# Patient Record
Sex: FEMALE | URBAN - METROPOLITAN AREA
[De-identification: names, ages, dates, MRNs, and addresses within clinical notes are randomized per-mention and may not be internally consistent; named-entity substitution may affect disease eponyms.]

---

## 2019-08-12 ENCOUNTER — APPOINTMENT (RX ONLY)
Dept: URBAN - METROPOLITAN AREA CLINIC 88 | Facility: CLINIC | Age: 69
Setting detail: DERMATOLOGY
End: 2019-08-12

## 2019-08-12 DIAGNOSIS — L40.0 PSORIASIS VULGARIS: ICD-10-CM

## 2019-08-12 DIAGNOSIS — L29.8 OTHER PRURITUS: ICD-10-CM

## 2019-08-12 DIAGNOSIS — L29.89 OTHER PRURITUS: ICD-10-CM

## 2019-08-12 PROCEDURE — ? ADDITIONAL NOTES

## 2019-08-12 PROCEDURE — 99202 OFFICE O/P NEW SF 15 MIN: CPT

## 2019-08-12 PROCEDURE — ? PRESCRIPTION

## 2019-08-12 PROCEDURE — ? TREATMENT REGIMEN

## 2019-08-12 RX ORDER — HYDROXYZINE HYDROCHLORIDE 25 MG/1
TABLET, FILM COATED ORAL
Qty: 30 | Refills: 2 | Status: ERX | COMMUNITY
Start: 2019-08-12

## 2019-08-12 RX ORDER — CLOBETASOL PROPIONATE 0.5 MG/ML
SHAMPOO TOPICAL
Qty: 1 | Refills: 2 | Status: ERX

## 2019-08-12 RX ORDER — CLOBETASOL PROPIONATE 0.5 MG/ML
SHAMPOO TOPICAL
Qty: 1 | Refills: 2 | Status: ERX | COMMUNITY
Start: 2019-08-12

## 2019-08-12 RX ORDER — CLOBETASOL PROPIONATE 0.46 MG/ML
SOLUTION TOPICAL
Qty: 1 | Refills: 4 | Status: ERX

## 2019-08-12 RX ORDER — CLOBETASOL PROPIONATE 0.46 MG/ML
SOLUTION TOPICAL
Qty: 1 | Refills: 4 | Status: ERX | COMMUNITY
Start: 2019-08-12

## 2019-08-12 RX ADMIN — HYDROXYZINE HYDROCHLORIDE: 25 TABLET, FILM COATED ORAL at 16:53

## 2019-08-12 RX ADMIN — CLOBETASOL PROPIONATE: 0.46 SOLUTION TOPICAL at 16:51

## 2019-08-12 RX ADMIN — CLOBETASOL PROPIONATE: 0.5 SHAMPOO TOPICAL at 16:51

## 2019-08-12 ASSESSMENT — LOCATION DETAILED DESCRIPTION DERM
LOCATION DETAILED: LEFT INFERIOR FRONTAL SCALP
LOCATION DETAILED: LEFT INFERIOR PARIETAL SCALP
LOCATION DETAILED: LEFT CENTRAL PARIETAL SCALP
LOCATION DETAILED: LEFT CENTRAL POSTAURICULAR SKIN

## 2019-08-12 ASSESSMENT — LOCATION ZONE DERM: LOCATION ZONE: SCALP

## 2019-08-12 ASSESSMENT — LOCATION SIMPLE DESCRIPTION DERM: LOCATION SIMPLE: SCALP

## 2019-08-12 NOTE — PROCEDURE: ADDITIONAL NOTES
Detail Level: Simple
Additional Notes: Hydroxyzine was also sent for patient to take at night as needed for extreme itching. *Rx was later resent to [facility] as shampoo and solution was totaling $200 at local

## 2019-08-12 NOTE — PROCEDURE: TREATMENT REGIMEN
Plan: Apply solution after hair wash regimen, BID x weeks as daily maintenance regimen
Action 3: Continue
Sig For Treatment 1 (If Needed): twice daily
Initiate Regimen: Clobetasol shampoo: QD x 2 weeks
Detail Level: Zone
Action 1: Start
Treatment 1: Clobetasol solution
Initiate Treatment: Hydroxyzine 25mg:QHS PRN itchy scalp due to psoriasis

## 2019-08-26 ENCOUNTER — APPOINTMENT (RX ONLY)
Dept: URBAN - METROPOLITAN AREA CLINIC 88 | Facility: CLINIC | Age: 69
Setting detail: DERMATOLOGY
End: 2019-08-26

## 2019-08-26 DIAGNOSIS — L29.8 OTHER PRURITUS: ICD-10-CM

## 2019-08-26 DIAGNOSIS — L40.0 PSORIASIS VULGARIS: ICD-10-CM

## 2019-08-26 DIAGNOSIS — L29.89 OTHER PRURITUS: ICD-10-CM

## 2019-08-26 PROCEDURE — 11900 INJECT SKIN LESIONS </W 7: CPT

## 2019-08-26 PROCEDURE — 99213 OFFICE O/P EST LOW 20 MIN: CPT | Mod: 25

## 2019-08-26 PROCEDURE — ? INTRALESIONAL KENALOG

## 2019-08-26 PROCEDURE — ? TREATMENT REGIMEN

## 2019-08-26 ASSESSMENT — LOCATION DETAILED DESCRIPTION DERM
LOCATION DETAILED: LEFT CENTRAL FRONTAL SCALP
LOCATION DETAILED: RIGHT INFERIOR OCCIPITAL SCALP
LOCATION DETAILED: LEFT SUPERIOR POSTAURICULAR SKIN
LOCATION DETAILED: LEFT INFERIOR PARIETAL SCALP
LOCATION DETAILED: RIGHT POSTAURICULAR SKIN
LOCATION DETAILED: RIGHT INFERIOR PARIETAL SCALP
LOCATION DETAILED: LEFT INFERIOR FRONTAL SCALP
LOCATION DETAILED: LEFT CENTRAL POSTAURICULAR SKIN
LOCATION DETAILED: LEFT CENTRAL PARIETAL SCALP
LOCATION DETAILED: LEFT INFERIOR OCCIPITAL SCALP

## 2019-08-26 ASSESSMENT — LOCATION SIMPLE DESCRIPTION DERM
LOCATION SIMPLE: SCALP
LOCATION SIMPLE: POSTERIOR SCALP

## 2019-08-26 ASSESSMENT — LOCATION ZONE DERM: LOCATION ZONE: SCALP

## 2019-08-26 NOTE — PROCEDURE: INTRALESIONAL KENALOG
Detail Level: Zone
Consent: The risks of atrophy were reviewed with the patient.
Total Volume (Ccs): 2.0
Kenalog Preparation: Kenalog
X Size Of Lesion In Cm (Optional): 0
Concentration Of Kenalog Solution Injected (Mg/Ml): 10.0
Administered By (Optional): Carol Solano
Medical Necessity Clause: This procedure was medically necessary because the lesions that were treated were:
Include Z78.9 (Other Specified Conditions Influencing Health Status) As An Associated Diagnosis?: No

## 2019-08-26 NOTE — PROCEDURE: TREATMENT REGIMEN
Action 4: Continue
Plan: Apply solution after hair wash regimen, BID x weeks as daily maintenance regimen
Modify Regimen: Hydroxyzine 1/2 a tablet of 25mg:QHS PRN itchy scalp due to psoriasis..
Continue Regimen: Clobetasol shampoo: QD x 2 weeks
Action 1: Start
Detail Level: Zone
Sig For Treatment 1 (If Needed): twice daily

## 2019-09-09 RX ORDER — HYDROXYZINE HYDROCHLORIDE 25 MG/1
TABLET, FILM COATED ORAL
Qty: 90 | Refills: 2 | Status: ERX

## 2019-09-10 ENCOUNTER — APPOINTMENT (RX ONLY)
Dept: URBAN - METROPOLITAN AREA CLINIC 88 | Facility: CLINIC | Age: 69
Setting detail: DERMATOLOGY
End: 2019-09-10

## 2019-09-10 DIAGNOSIS — L40.0 PSORIASIS VULGARIS: ICD-10-CM

## 2019-09-10 DIAGNOSIS — L29.89 OTHER PRURITUS: ICD-10-CM

## 2019-09-10 DIAGNOSIS — L29.8 OTHER PRURITUS: ICD-10-CM

## 2019-09-10 PROBLEM — E03.9 HYPOTHYROIDISM, UNSPECIFIED: Status: ACTIVE | Noted: 2019-09-10

## 2019-09-10 PROCEDURE — ? INTRALESIONAL KENALOG

## 2019-09-10 PROCEDURE — 99213 OFFICE O/P EST LOW 20 MIN: CPT | Mod: 25

## 2019-09-10 PROCEDURE — ? TREATMENT REGIMEN

## 2019-09-10 PROCEDURE — 11900 INJECT SKIN LESIONS </W 7: CPT

## 2019-09-10 ASSESSMENT — LOCATION SIMPLE DESCRIPTION DERM
LOCATION SIMPLE: POSTERIOR SCALP
LOCATION SIMPLE: SCALP

## 2019-09-10 ASSESSMENT — LOCATION DETAILED DESCRIPTION DERM
LOCATION DETAILED: RIGHT POSTAURICULAR SKIN
LOCATION DETAILED: LEFT INFERIOR OCCIPITAL SCALP
LOCATION DETAILED: RIGHT INFERIOR PARIETAL SCALP
LOCATION DETAILED: LEFT CENTRAL POSTAURICULAR SKIN
LOCATION DETAILED: LEFT CENTRAL PARIETAL SCALP
LOCATION DETAILED: LEFT INFERIOR FRONTAL SCALP
LOCATION DETAILED: LEFT SUPERIOR POSTAURICULAR SKIN
LOCATION DETAILED: LEFT CENTRAL FRONTAL SCALP
LOCATION DETAILED: RIGHT INFERIOR OCCIPITAL SCALP
LOCATION DETAILED: LEFT INFERIOR PARIETAL SCALP

## 2019-09-10 ASSESSMENT — LOCATION ZONE DERM: LOCATION ZONE: SCALP

## 2019-09-10 NOTE — PROCEDURE: INTRALESIONAL KENALOG
Consent: The risks of atrophy were reviewed with the patient.
Total Volume (Ccs): 2.0
Concentration Of Kenalog Solution Injected (Mg/Ml): 10.0
Administered By (Optional): IMTIAZ
X Size Of Lesion In Cm (Optional): 0
Medical Necessity Clause: This procedure was medically necessary because the lesions that were treated were:
Detail Level: Zone
Include Z78.9 (Other Specified Conditions Influencing Health Status) As An Associated Diagnosis?: No
Kenalog Preparation: Kenalog

## 2019-09-10 NOTE — PROCEDURE: TREATMENT REGIMEN
Action 3: Continue
Continue Regimen: Clobetasol shampoo: QD x 2 weeks
Plan: Apply solution after hair wash regimen, BID x weeks as daily maintenance regimen
Modify Regimen: Hydroxyzine 1/2 a tablet of 25mg:QHS PRN itchy scalp due to psoriasis..
Sig For Treatment 1 (If Needed): twice daily
Action 1: Start
Detail Level: Zone

## 2019-12-23 ENCOUNTER — APPOINTMENT (RX ONLY)
Dept: URBAN - METROPOLITAN AREA CLINIC 88 | Facility: CLINIC | Age: 69
Setting detail: DERMATOLOGY
End: 2019-12-23

## 2019-12-23 DIAGNOSIS — L29.89 OTHER PRURITUS: ICD-10-CM

## 2019-12-23 DIAGNOSIS — L40.0 PSORIASIS VULGARIS: ICD-10-CM

## 2019-12-23 DIAGNOSIS — L29.8 OTHER PRURITUS: ICD-10-CM

## 2019-12-23 PROCEDURE — ? TREATMENT REGIMEN

## 2019-12-23 PROCEDURE — ? INTRALESIONAL KENALOG

## 2019-12-23 PROCEDURE — ? COUNSELING

## 2019-12-23 PROCEDURE — 11900 INJECT SKIN LESIONS </W 7: CPT

## 2019-12-23 PROCEDURE — ? PRESCRIPTION

## 2019-12-23 PROCEDURE — 99213 OFFICE O/P EST LOW 20 MIN: CPT | Mod: 25

## 2019-12-23 RX ORDER — CLOBETASOL PROPIONATE 0.5 MG/ML
SOLUTION TOPICAL
Qty: 1 | Refills: 5 | Status: ERX

## 2019-12-23 ASSESSMENT — LOCATION DETAILED DESCRIPTION DERM
LOCATION DETAILED: RIGHT INFERIOR OCCIPITAL SCALP
LOCATION DETAILED: MID-OCCIPITAL SCALP
LOCATION DETAILED: RIGHT OCCIPITAL SCALP

## 2019-12-23 ASSESSMENT — LOCATION SIMPLE DESCRIPTION DERM
LOCATION SIMPLE: POSTERIOR SCALP
LOCATION SIMPLE: SCALP

## 2019-12-23 ASSESSMENT — LOCATION ZONE DERM: LOCATION ZONE: SCALP

## 2019-12-23 NOTE — PROCEDURE: TREATMENT REGIMEN
Action 1: Continue
Treatment 1: Calcipotriene solution
Otc Regimen: Keralyte shampoo wash once daily with shampoo
Detail Level: Zone

## 2019-12-23 NOTE — PROCEDURE: INTRALESIONAL KENALOG
Total Volume (Ccs): 2.0
Medical Necessity Clause: This procedure was medically necessary because the lesions that were treated were:
Concentration Of Kenalog Solution Injected (Mg/Ml): 10.0
Include Z78.9 (Other Specified Conditions Influencing Health Status) As An Associated Diagnosis?: No
Administered By (Optional): IMTIAZ
Consent: The risks of atrophy were reviewed with the patient.
Detail Level: Zone
Kenalog Preparation: Kenalog
X Size Of Lesion In Cm (Optional): 0

## 2020-02-18 ENCOUNTER — APPOINTMENT (RX ONLY)
Dept: URBAN - METROPOLITAN AREA CLINIC 88 | Facility: CLINIC | Age: 70
Setting detail: DERMATOLOGY
End: 2020-02-18

## 2020-02-18 DIAGNOSIS — L40.0 PSORIASIS VULGARIS: ICD-10-CM

## 2020-02-18 DIAGNOSIS — L29.8 OTHER PRURITUS: ICD-10-CM

## 2020-02-18 DIAGNOSIS — L29.89 OTHER PRURITUS: ICD-10-CM

## 2020-02-18 PROBLEM — E05.90 THYROTOXICOSIS, UNSPECIFIED WITHOUT THYROTOXIC CRISIS OR STORM: Status: ACTIVE | Noted: 2020-02-18

## 2020-02-18 PROCEDURE — 99213 OFFICE O/P EST LOW 20 MIN: CPT

## 2020-02-18 PROCEDURE — ? COUNSELING

## 2020-02-18 PROCEDURE — ? PRESCRIPTION

## 2020-02-18 RX ORDER — CLOBETASOL PROPIONATE 0.5 MG/ML
SOLUTION TOPICAL
Qty: 1 | Refills: 5 | Status: ERX